# Patient Record
Sex: FEMALE | Race: WHITE | NOT HISPANIC OR LATINO
[De-identification: names, ages, dates, MRNs, and addresses within clinical notes are randomized per-mention and may not be internally consistent; named-entity substitution may affect disease eponyms.]

---

## 2018-07-03 PROBLEM — Z00.00 ENCOUNTER FOR PREVENTIVE HEALTH EXAMINATION: Status: ACTIVE | Noted: 2018-07-03

## 2018-08-02 ENCOUNTER — APPOINTMENT (OUTPATIENT)
Dept: BREAST CENTER | Facility: CLINIC | Age: 53
End: 2018-08-02
Payer: COMMERCIAL

## 2018-08-02 VITALS
SYSTOLIC BLOOD PRESSURE: 107 MMHG | HEART RATE: 101 BPM | HEIGHT: 65 IN | BODY MASS INDEX: 26.66 KG/M2 | DIASTOLIC BLOOD PRESSURE: 82 MMHG | WEIGHT: 160 LBS

## 2018-08-02 DIAGNOSIS — Z80.9 FAMILY HISTORY OF MALIGNANT NEOPLASM, UNSPECIFIED: ICD-10-CM

## 2018-08-02 DIAGNOSIS — Z80.1 FAMILY HISTORY OF MALIGNANT NEOPLASM OF TRACHEA, BRONCHUS AND LUNG: ICD-10-CM

## 2018-08-02 DIAGNOSIS — Z82.49 FAMILY HISTORY OF ISCHEMIC HEART DISEASE AND OTHER DISEASES OF THE CIRCULATORY SYSTEM: ICD-10-CM

## 2018-08-02 DIAGNOSIS — Z78.9 OTHER SPECIFIED HEALTH STATUS: ICD-10-CM

## 2018-08-02 DIAGNOSIS — I10 ESSENTIAL (PRIMARY) HYPERTENSION: ICD-10-CM

## 2018-08-02 PROCEDURE — 99214 OFFICE O/P EST MOD 30 MIN: CPT

## 2018-08-02 RX ORDER — LETROZOLE TABLETS 2.5 MG/1
2.5 TABLET, FILM COATED ORAL
Refills: 0 | Status: ACTIVE | COMMUNITY

## 2018-08-02 RX ORDER — ASPIRIN 81 MG
81 TABLET,CHEWABLE ORAL
Refills: 0 | Status: ACTIVE | COMMUNITY

## 2018-08-02 RX ORDER — IBUPROFEN 200 MG
600 CAPSULE ORAL
Refills: 0 | Status: ACTIVE | COMMUNITY

## 2018-08-02 RX ORDER — ADHESIVE TAPE 3"X 2.3 YD
50 MCG TAPE, NON-MEDICATED TOPICAL
Refills: 0 | Status: ACTIVE | COMMUNITY

## 2019-03-08 ENCOUNTER — APPOINTMENT (OUTPATIENT)
Dept: BREAST CENTER | Facility: CLINIC | Age: 54
End: 2019-03-08
Payer: COMMERCIAL

## 2019-03-08 VITALS
HEIGHT: 65 IN | HEART RATE: 75 BPM | WEIGHT: 152 LBS | SYSTOLIC BLOOD PRESSURE: 134 MMHG | BODY MASS INDEX: 25.33 KG/M2 | DIASTOLIC BLOOD PRESSURE: 88 MMHG

## 2019-03-08 PROCEDURE — 99214 OFFICE O/P EST MOD 30 MIN: CPT

## 2019-03-08 RX ORDER — ATORVASTATIN CALCIUM 10 MG/1
10 TABLET, FILM COATED ORAL
Refills: 0 | Status: ACTIVE | COMMUNITY

## 2019-03-08 NOTE — HISTORY OF PRESENT ILLNESS
[FreeTextEntry1] : S/P Bilat SSMX/R SLN/Implt (8/13/12)(DDP): R: +1.6cm IDCA/DCIS, SBR II, +LVI, -0/5 LN, +ant margin, ER+, [place]+, Her2 3+. L: No atypia/Ca\par R Stage IA IDCA\par S/P chemo/Herceptin (Mittelman)\par S/P R PMRT (Tinger)\par S/P L Implt explantation secondary to infection\par S/P Bilat LD Reconstx (10/13)(Jonathan)\par Started tmx/ASAb (5/13) > d/c'ed (4/18) > sw'ed to [facility] (4/18)\par +FH Br Ca (P. GM 90's)\par No MH/FH changes. Taking Ca/D. ROS reviewed/discussed. Last Bone Densitometry (11/16): "WNL"

## 2019-03-08 NOTE — PHYSICAL EXAM
[Normocephalic] : normocephalic [Atraumatic] : atraumatic [Supple] : supple [No Supraclavicular Adenopathy] : no supraclavicular adenopathy [No Cervical Adenopathy] : no cervical adenopathy [No Thyromegaly] : no thyromegaly [Normal Sinus Rhythm] : normal sinus rhythm [Examined in the supine and seated position] : examined in the supine and seated position [No dominant masses] : no dominant masses in right breast  [No dominant masses] : no dominant masses left breast [No Nipple Retraction] : no left nipple retraction [No Nipple Discharge] : no left nipple discharge [No Axillary Lymphadenopathy] : no left axillary lymphadenopathy [No Edema] : no edema [No Rashes] : no rashes [No Ulceration] : no ulceration [de-identified] : S/P SSMX/SLN/LD w/o rec. %. No lymphedema.  [de-identified] : S/P SSMX/LD w/o rec. %. No lymphedema.

## 2020-01-06 ENCOUNTER — APPOINTMENT (OUTPATIENT)
Dept: BREAST CENTER | Facility: CLINIC | Age: 55
End: 2020-01-06
Payer: COMMERCIAL

## 2020-01-06 VITALS
DIASTOLIC BLOOD PRESSURE: 76 MMHG | WEIGHT: 160 LBS | HEIGHT: 65 IN | SYSTOLIC BLOOD PRESSURE: 116 MMHG | BODY MASS INDEX: 26.66 KG/M2 | HEART RATE: 99 BPM

## 2020-01-06 PROCEDURE — 99214 OFFICE O/P EST MOD 30 MIN: CPT

## 2020-01-06 RX ORDER — HYDROCHLOROTHIAZIDE 12.5 MG/1
12.5 CAPSULE ORAL
Refills: 0 | Status: DISCONTINUED | COMMUNITY
End: 2020-01-06

## 2020-01-06 NOTE — HISTORY OF PRESENT ILLNESS
[FreeTextEntry1] : S/P Bilat SSMX/R SLN/Implt (8/13/12)(DDP): R: +1.6cm IDCA/DCIS, SBR II, +LVI, -0/5 LN, +ant margin, ER+, [place]+, Her2 3+. L: No atypia/Ca\par R Stage IA IDCA\par S/P chemo/Herceptin (Mittelman)\par S/P R PMRT (Tinger)\par S/P L Implt explantation secondary to infection\par S/P Bilat LD Reconstx (10/13)(Jonathan)\par Started tmx/ASAb (5/13) > d/c'ed (4/18) > sw'ed to [facility] (4/18)\par +FH Br Ca (P. GM 90's)\par +FH Pancr Ca (M. GF)\par BRCA (2012):"-"\par No MH/FH changes. Taking Ca/D. ROS reviewed/discussed. Last Bone Densitometry (11/16): "WNL"\par Thyroid Sono (6/14/19): Stable multinod gland

## 2020-01-06 NOTE — PHYSICAL EXAM
[Normocephalic] : normocephalic [Atraumatic] : atraumatic [Supple] : supple [No Cervical Adenopathy] : no cervical adenopathy [No Supraclavicular Adenopathy] : no supraclavicular adenopathy [Normal Sinus Rhythm] : normal sinus rhythm [No Thyromegaly] : no thyromegaly [No dominant masses] : no dominant masses in right breast  [Examined in the supine and seated position] : examined in the supine and seated position [No dominant masses] : no dominant masses left breast [No Nipple Retraction] : no left nipple retraction [No Nipple Discharge] : no left nipple discharge [No Axillary Lymphadenopathy] : no left axillary lymphadenopathy [No Edema] : no edema [No Rashes] : no rashes [No Ulceration] : no ulceration [de-identified] : S/P SSMX/SLN/LD w/o rec. %. No lymphedema.  [de-identified] : S/P SSMX/LD w/o susp fx's. %. No lymphedema.

## 2020-07-08 ENCOUNTER — APPOINTMENT (OUTPATIENT)
Dept: BREAST CENTER | Facility: CLINIC | Age: 55
End: 2020-07-08
Payer: COMMERCIAL

## 2020-07-08 VITALS
BODY MASS INDEX: 26.16 KG/M2 | HEART RATE: 103 BPM | HEIGHT: 65 IN | SYSTOLIC BLOOD PRESSURE: 109 MMHG | DIASTOLIC BLOOD PRESSURE: 76 MMHG | WEIGHT: 157 LBS

## 2020-07-08 PROCEDURE — 99214 OFFICE O/P EST MOD 30 MIN: CPT

## 2020-07-08 NOTE — PHYSICAL EXAM
[Normocephalic] : normocephalic [Atraumatic] : atraumatic [No Supraclavicular Adenopathy] : no supraclavicular adenopathy [Supple] : supple [No Cervical Adenopathy] : no cervical adenopathy [No Thyromegaly] : no thyromegaly [Examined in the supine and seated position] : examined in the supine and seated position [Normal Sinus Rhythm] : normal sinus rhythm [No dominant masses] : no dominant masses left breast [No dominant masses] : no dominant masses in right breast  [No Nipple Retraction] : no right nipple retraction [No Axillary Lymphadenopathy] : no left axillary lymphadenopathy [No Nipple Discharge] : no left nipple discharge [No Edema] : no edema [No Rashes] : no rashes [No Ulceration] : no ulceration [de-identified] : S/P SSMX/SLN/LD w/o rec. %. No lymphedema.  [de-identified] : S/P SSMX/LD w/o rec. %. No lymphedema.

## 2020-07-08 NOTE — HISTORY OF PRESENT ILLNESS
[FreeTextEntry1] : S/P Bilat SSMX/R SLN/Implt (8/13/12)(DDP): R: +1.6cm IDCA/DCIS, SBR II, +LVI, -0/5 LN, +ant margin, ER+, [place]+, Her2 3+. L: No atypia/Ca\par R Stage IA IDCA\par S/P chemo/Herceptin (Mittelman)\par S/P R PMRT (Tinger)\par S/P L Implt explantation secondary to infection\par S/P Bilat LD Reconstx (10/13)(Jonathan)\par Started tmx/ASAb (5/13) > d/c'ed (4/18) > sw'ed to [facility] (4/18)\par +FH Br Ca (P. GM 90's)\par +FH Pancr Ca (M. GF)\par BRCA (2012):"-"\par No MH/FH changes. Taking Ca/D. ROS reviewed/discussed. Last Bone Densitometry (11/16): "WNL"\par Thyroid Sono (6/14/19): Stable multinod gland

## 2021-01-14 ENCOUNTER — APPOINTMENT (OUTPATIENT)
Dept: BREAST CENTER | Facility: CLINIC | Age: 56
End: 2021-01-14
Payer: COMMERCIAL

## 2021-01-14 VITALS
WEIGHT: 144 LBS | HEART RATE: 111 BPM | HEIGHT: 65 IN | DIASTOLIC BLOOD PRESSURE: 79 MMHG | SYSTOLIC BLOOD PRESSURE: 112 MMHG | BODY MASS INDEX: 23.99 KG/M2

## 2021-01-14 PROCEDURE — 99214 OFFICE O/P EST MOD 30 MIN: CPT

## 2021-01-14 PROCEDURE — 99072 ADDL SUPL MATRL&STAF TM PHE: CPT

## 2021-01-14 RX ORDER — METOPROLOL SUCCINATE 25 MG/1
25 TABLET, EXTENDED RELEASE ORAL
Refills: 0 | Status: DISCONTINUED | COMMUNITY
End: 2021-01-14

## 2021-01-14 NOTE — PHYSICAL EXAM
[Normocephalic] : normocephalic [Atraumatic] : atraumatic [Supple] : supple [No Supraclavicular Adenopathy] : no supraclavicular adenopathy [No Cervical Adenopathy] : no cervical adenopathy [No Thyromegaly] : no thyromegaly [Normal Sinus Rhythm] : normal sinus rhythm [Examined in the supine and seated position] : examined in the supine and seated position [No dominant masses] : no dominant masses in right breast  [No dominant masses] : no dominant masses left breast [No Nipple Retraction] : no left nipple retraction [No Nipple Discharge] : no left nipple discharge [No Axillary Lymphadenopathy] : no left axillary lymphadenopathy [No Edema] : no edema [No Rashes] : no rashes [No Ulceration] : no ulceration [de-identified] : S/P SSMX/SLN/LD w/o rec. %. No lymphedema.\par  [de-identified] : S/P SSMX/LD w/o rec. %. No lymphedema.\par

## 2021-01-14 NOTE — HISTORY OF PRESENT ILLNESS
[FreeTextEntry1] : S/P Bilat SSMX/R SLN/Implt (8/13/12)(DDP): R: +1.6cm IDCA/DCIS, SBR II, +LVI, -0/5 LN, +ant margin, ER+, [place]+, Her2 3+. L: No atypia/Ca\par R Stage IA IDCA\par S/P chemo/Herceptin (Mittelman)\par S/P R PMRT (Tinger)\par S/P L Implt explantation secondary to infection\par S/P Bilat LD Reconstx (10/13)(Jonathan)\par Started tmx/ASAb (5/13) > d/c'ed (4/18) > sw'ed to [facility] (4/18)\par +FH Br Ca (P. GM 90's)\par +FH Pancr Ca (M. GF)\par BRCA (2012):"-"\par No MH/FH changes. Taking Ca/D. ROS reviewed/discussed. Last Bone Densitometry (11/16): "WNL"\par Thyroid Sono (6/14/19): Stable multinod gland
negative...

## 2021-02-14 ENCOUNTER — TRANSCRIPTION ENCOUNTER (OUTPATIENT)
Age: 56
End: 2021-02-14

## 2021-07-28 ENCOUNTER — APPOINTMENT (OUTPATIENT)
Dept: BREAST CENTER | Facility: CLINIC | Age: 56
End: 2021-07-28
Payer: COMMERCIAL

## 2021-07-28 ENCOUNTER — NON-APPOINTMENT (OUTPATIENT)
Age: 56
End: 2021-07-28

## 2021-07-28 VITALS
BODY MASS INDEX: 24.16 KG/M2 | OXYGEN SATURATION: 98 % | DIASTOLIC BLOOD PRESSURE: 83 MMHG | WEIGHT: 145 LBS | SYSTOLIC BLOOD PRESSURE: 129 MMHG | HEIGHT: 65 IN | HEART RATE: 88 BPM

## 2021-07-28 PROCEDURE — 99214 OFFICE O/P EST MOD 30 MIN: CPT

## 2021-07-28 RX ORDER — AZILSARTAN KAMEDOXOMIL 40 MG/1
40 TABLET ORAL
Refills: 0 | Status: DISCONTINUED | COMMUNITY
End: 2021-07-28

## 2021-07-28 NOTE — HISTORY OF PRESENT ILLNESS
[FreeTextEntry1] : S/P Bilat SSMX/R SLN/Implt (8/13/12)(DDP): R: +1.6cm IDCA/DCIS, SBR II, +LVI, -0/5 LN, +ant margin, ER+, [place]+, Her2 3+. L: No atypia/Ca\par R Stage IA IDCA\par S/P chemo/Herceptin (Mittelman)\par S/P R PMRT (Tinger)\par S/P L Implt explantation secondary to infection\par S/P Bilat LD Reconstx (10/13)(Jonathan)\par Started tmx/ASAb (5/13) > d/c'ed (4/18) > sw'ed to [facility] (4/18)\par +FH Br Ca (P. GM 90's)\par +FH Pancr Ca (M. GF)\par BRCA (2012):"-"\par No MH/FH changes. Taking Ca/D. ROS reviewed/discussed. Last Bone Densitometry (11/16): "WNL"\par Thyroid Sono (6/14/19): Stable multinod gland

## 2021-07-28 NOTE — PHYSICAL EXAM
[Normocephalic] : normocephalic [Atraumatic] : atraumatic [Supple] : supple [No Supraclavicular Adenopathy] : no supraclavicular adenopathy [No Cervical Adenopathy] : no cervical adenopathy [No Thyromegaly] : no thyromegaly [Normal Sinus Rhythm] : normal sinus rhythm [Examined in the supine and seated position] : examined in the supine and seated position [No dominant masses] : no dominant masses in right breast  [No dominant masses] : no dominant masses left breast [No Nipple Retraction] : no left nipple retraction [No Nipple Discharge] : no left nipple discharge [No Axillary Lymphadenopathy] : no left axillary lymphadenopathy [No Edema] : no edema [No Rashes] : no rashes [No Ulceration] : no ulceration [de-identified] : S/P SSMX/SLN/LD w/o rec. %. No lymphedema.  [de-identified] : S/P SSMX/LD w/o susp fx's. %. No lymphedema

## 2022-01-12 ENCOUNTER — APPOINTMENT (OUTPATIENT)
Dept: BREAST CENTER | Facility: CLINIC | Age: 57
End: 2022-01-12
Payer: COMMERCIAL

## 2022-01-12 VITALS
HEART RATE: 96 BPM | HEIGHT: 65 IN | SYSTOLIC BLOOD PRESSURE: 136 MMHG | DIASTOLIC BLOOD PRESSURE: 88 MMHG | WEIGHT: 152 LBS | BODY MASS INDEX: 25.33 KG/M2

## 2022-01-12 PROCEDURE — 99214 OFFICE O/P EST MOD 30 MIN: CPT

## 2022-01-12 NOTE — PHYSICAL EXAM
[Normocephalic] : normocephalic [Atraumatic] : atraumatic [Supple] : supple [No Supraclavicular Adenopathy] : no supraclavicular adenopathy [No Cervical Adenopathy] : no cervical adenopathy [No Thyromegaly] : no thyromegaly [Normal Sinus Rhythm] : normal sinus rhythm [Examined in the supine and seated position] : examined in the supine and seated position [No dominant masses] : no dominant masses in right breast  [No dominant masses] : no dominant masses left breast [No Nipple Retraction] : no left nipple retraction [No Nipple Discharge] : no left nipple discharge [No Axillary Lymphadenopathy] : no left axillary lymphadenopathy [No Edema] : no edema [No Rashes] : no rashes [No Ulceration] : no ulceration [de-identified] : S/P SSMX/SLN/LD w/o rec. %. No lymphedema.  [de-identified] : S/P SSMX/LD w/o susp fx's. %. No lymphedema

## 2022-01-12 NOTE — HISTORY OF PRESENT ILLNESS
[FreeTextEntry1] : S/P Bilat SSMX/R SLN/Implt (8/13/12)(DDP): R: +1.6cm IDCA/DCIS, SBR II, +LVI, -0/5 LN, +ant margin, ER+, [place]+, Her2 3+. L: No atypia/Ca\par R Stage IA IDCA\par S/P chemo/Herceptin (Mittelman)\par S/P R PMRT (Tinger)\par S/P L Implt explantation secondary to infection\par S/P Bilat LD Reconstx (10/13)(Jonathan)\par Started tmx/ASAb (5/13) > d/c'ed (4/18) > sw'ed to [facility] (4/18)\par +FH Br Ca (P. GM 90's)\par +FH Pancr Ca (M. GF)\par BRCA (Myriad BERRY) 7/24/12): -\par Got COVID (11/21) > monoclonal AB inf > min sx's\par Got second Pfizer (4/21)(LUE)\par Got Moderna booster (12/21)(LUE)\par No MH/FH changes. Taking Ca/D. ROS reviewed/discussed. Last Bone Densitometry (11/16): "WNL"\par Thyroid Sono (6/14/19): Stable multinod gland

## 2022-07-27 ENCOUNTER — APPOINTMENT (OUTPATIENT)
Dept: BREAST CENTER | Facility: CLINIC | Age: 57
End: 2022-07-27

## 2022-07-27 VITALS
WEIGHT: 160 LBS | HEIGHT: 65 IN | BODY MASS INDEX: 26.66 KG/M2 | HEART RATE: 97 BPM | SYSTOLIC BLOOD PRESSURE: 151 MMHG | DIASTOLIC BLOOD PRESSURE: 87 MMHG

## 2022-07-27 PROCEDURE — 99214 OFFICE O/P EST MOD 30 MIN: CPT

## 2022-07-27 RX ORDER — LOSARTAN POTASSIUM 25 MG/1
25 TABLET, FILM COATED ORAL
Qty: 90 | Refills: 0 | Status: ACTIVE | COMMUNITY
Start: 2021-07-29

## 2022-07-27 NOTE — HISTORY OF PRESENT ILLNESS
[FreeTextEntry1] : S/P Bilat SSMX/R SLN/Implt (8/13/12)(DDP): R: +1.6cm IDCA/DCIS, SBR II, +LVI, -0/5 LN, +ant margin, ER+, [place]+, Her2 3+. L: No atypia/Ca\par R Stage IA IDCA\par S/P chemo/Herceptin (Mittelman)\par S/P R PMRT (Tinger)\par S/P L Implt explantation secondary to infection\par S/P Bilat LD Reconstx (10/13)(Jonathan)\par Started tmx/ASAb (5/13) > d/c'ed (4/18) > sw'ed to [facility] (4/18)\par +FH Br Ca (P. GM 90's)\par +FH Pancr Ca (M. GF)\par BRCA (Myriad BERRY) 7/24/12): -\par Got COVID (11/21) > monoclonal AB inf > min sx's\par Got second Pfizer (4/21)(LUE)\par Got Moderna booster (12/21)(LUE)\par No MH/FH changes. Taking Ca/D. ROS reviewed/discussed. Last Bone Densitometry (11/16): "WNL"\par Thyroid Sono (6/14/19): Stable multinod gland

## 2022-07-27 NOTE — PHYSICAL EXAM
[Normocephalic] : normocephalic [Atraumatic] : atraumatic [Supple] : supple [No Supraclavicular Adenopathy] : no supraclavicular adenopathy [No Cervical Adenopathy] : no cervical adenopathy [No Thyromegaly] : no thyromegaly [Normal Sinus Rhythm] : normal sinus rhythm [Examined in the supine and seated position] : examined in the supine and seated position [No dominant masses] : no dominant masses in right breast  [No dominant masses] : no dominant masses left breast [No Nipple Retraction] : no left nipple retraction [No Nipple Discharge] : no left nipple discharge [No Axillary Lymphadenopathy] : no left axillary lymphadenopathy [No Edema] : no edema [No Rashes] : no rashes [No Ulceration] : no ulceration [de-identified] : S/P SSMX/SLN/PMRT/LD w/o rec. %. No lymphedema.  [de-identified] : S/P SSMX/LD w/o susp fx's. %. No lymphedema

## 2023-01-25 ENCOUNTER — APPOINTMENT (OUTPATIENT)
Dept: BREAST CENTER | Facility: CLINIC | Age: 58
End: 2023-01-25
Payer: COMMERCIAL

## 2023-01-25 VITALS
WEIGHT: 163 LBS | SYSTOLIC BLOOD PRESSURE: 138 MMHG | BODY MASS INDEX: 27.16 KG/M2 | HEART RATE: 83 BPM | DIASTOLIC BLOOD PRESSURE: 80 MMHG | HEIGHT: 65 IN

## 2023-01-25 DIAGNOSIS — E04.1 NONTOXIC SINGLE THYROID NODULE: ICD-10-CM

## 2023-01-25 DIAGNOSIS — K80.20 CALCULUS OF GALLBLADDER W/OUT CHOLECYSTITIS W/OUT OBSTRUCTION: ICD-10-CM

## 2023-01-25 PROCEDURE — 99214 OFFICE O/P EST MOD 30 MIN: CPT

## 2023-01-25 RX ORDER — NIRMATRELVIR AND RITONAVIR 300-100 MG
20 X 150 MG & KIT ORAL
Qty: 30 | Refills: 0 | Status: DISCONTINUED | COMMUNITY
Start: 2022-05-26 | End: 2023-01-25

## 2023-01-25 NOTE — PHYSICAL EXAM
[Normocephalic] : normocephalic [Atraumatic] : atraumatic [Supple] : supple [No Supraclavicular Adenopathy] : no supraclavicular adenopathy [No Cervical Adenopathy] : no cervical adenopathy [No Thyromegaly] : no thyromegaly [Normal Sinus Rhythm] : normal sinus rhythm [Examined in the supine and seated position] : examined in the supine and seated position [No dominant masses] : no dominant masses in right breast  [No dominant masses] : no dominant masses left breast [No Nipple Retraction] : no left nipple retraction [No Nipple Discharge] : no left nipple discharge [No Axillary Lymphadenopathy] : no left axillary lymphadenopathy [No Edema] : no edema [No Rashes] : no rashes [No Ulceration] : no ulceration [de-identified] : S/P SSMX/SLN/PMRT/LD w/o rec. %. No lymphedema.  [de-identified] : S/P SSMX/LD w/o susp fx's. %. No lymphedema

## 2023-07-29 ENCOUNTER — NON-APPOINTMENT (OUTPATIENT)
Age: 58
End: 2023-07-29

## 2023-08-02 ENCOUNTER — APPOINTMENT (OUTPATIENT)
Dept: BREAST CENTER | Facility: CLINIC | Age: 58
End: 2023-08-02
Payer: COMMERCIAL

## 2023-08-02 VITALS
DIASTOLIC BLOOD PRESSURE: 92 MMHG | WEIGHT: 169 LBS | BODY MASS INDEX: 28.16 KG/M2 | SYSTOLIC BLOOD PRESSURE: 137 MMHG | HEIGHT: 65 IN | HEART RATE: 106 BPM

## 2023-08-02 PROCEDURE — 99214 OFFICE O/P EST MOD 30 MIN: CPT

## 2023-08-02 NOTE — HISTORY OF PRESENT ILLNESS
[FreeTextEntry1] : S/P Bilat SSMX/R SLN/Implt (8/13/12)(DDP): R: +1.6cm IDCA/DCIS, SBR II, +LVI, -0/5 LN, +ant margin, ER+, [place]+, Her2 3+. L: No atypia/Ca R Stage IA IDCA S/P chemo/Herceptin (Mittelman) S/P R PMRT (Tinger) S/P L Implt explantation secondary to infection S/P Bilat LD Reconstx (10/13)(Jonathan) Started tmx/ASAb (5/13) > d/c'ed (4/18) > sw'ed to [facility] (4/18) +FH Br Ca (P. GM 90's) +FH Pancr Ca (M. GF) BRCA (Myriad BERRY) 7/24/12): - S/P cataracts OD (6/23) > went well Colonoscopy(2/23): "WNL" > 10yrs  PAP/Pelvic (3/23): "WNL"  Got COVID (11/21) > monoclonal AB inf > min sx's Got second Pfizer (4/21)(LUE) Got Moderna booster (12/21)(LUE) No MH/FH changes. Taking Ca/D. ROS reviewed/discussed. Last Bone Densitometry (11/16): "WNL" Thyroid Sono (6/14/19): Stable multinod gland

## 2023-08-02 NOTE — PHYSICAL EXAM
[Normocephalic] : normocephalic [Atraumatic] : atraumatic [Supple] : supple [No Supraclavicular Adenopathy] : no supraclavicular adenopathy [No Cervical Adenopathy] : no cervical adenopathy [No Thyromegaly] : no thyromegaly [Normal Sinus Rhythm] : normal sinus rhythm [Examined in the supine and seated position] : examined in the supine and seated position [No dominant masses] : no dominant masses in right breast  [No dominant masses] : no dominant masses left breast [No Nipple Retraction] : no left nipple retraction [No Nipple Discharge] : no left nipple discharge [No Axillary Lymphadenopathy] : no left axillary lymphadenopathy [No Edema] : no edema [No Rashes] : no rashes [No Ulceration] : no ulceration [de-identified] : S/P SSMX/SLN/PMRT/LD w/o rec. %. No lymphedema.  [de-identified] : S/P SSMX/LD w/o susp fx's. %. No lymphedema

## 2024-02-07 ENCOUNTER — APPOINTMENT (OUTPATIENT)
Dept: BREAST CENTER | Facility: CLINIC | Age: 59
End: 2024-02-07
Payer: COMMERCIAL

## 2024-02-07 VITALS
SYSTOLIC BLOOD PRESSURE: 138 MMHG | HEIGHT: 65 IN | BODY MASS INDEX: 28.66 KG/M2 | WEIGHT: 172 LBS | HEART RATE: 90 BPM | DIASTOLIC BLOOD PRESSURE: 92 MMHG

## 2024-02-07 DIAGNOSIS — Z92.3 PERSONAL HISTORY OF IRRADIATION: ICD-10-CM

## 2024-02-07 DIAGNOSIS — Z92.21 PERSONAL HISTORY OF ANTINEOPLASTIC CHEMOTHERAPY: ICD-10-CM

## 2024-02-07 DIAGNOSIS — Z80.0 FAMILY HISTORY OF MALIGNANT NEOPLASM OF DIGESTIVE ORGANS: ICD-10-CM

## 2024-02-07 DIAGNOSIS — C50.911 MALIGNANT NEOPLASM OF UNSPECIFIED SITE OF RIGHT FEMALE BREAST: ICD-10-CM

## 2024-02-07 DIAGNOSIS — Z86.16 PERSONAL HISTORY OF COVID-19: ICD-10-CM

## 2024-02-07 DIAGNOSIS — Z80.3 FAMILY HISTORY OF MALIGNANT NEOPLASM OF BREAST: ICD-10-CM

## 2024-02-07 PROCEDURE — 99213 OFFICE O/P EST LOW 20 MIN: CPT

## 2024-02-07 NOTE — HISTORY OF PRESENT ILLNESS
[FreeTextEntry1] : S/P Bilat SSMX/R SLN/Implt (8/13/12)(DDP): R: +1.6cm IDCA/DCIS, SBR II, +LVI, -0/5 LN, +ant margin, ER+, [place]+, Her2 3+. L: No atypia/Ca R Stage IA IDCA S/P chemo/Herceptin (Mittelman) S/P R PMRT (Tinger) S/P L Implt explantation secondary to infection S/P Bilat LD Reconstx (10/13)(Rufusabrobson) Started tmx/ASAb (5/13) > d/c'ed (4/18) > sw'ed to [facility] (4/18) > [facility]/ASAb d/c'ed (10/23) +FH Br Ca (P. GM 90's) +FH Pancr Ca (M. GF) BRCA (Myriad BERRY) 7/24/12): - S/P cataracts OD (6/23) > went well Colonoscopy(2/23): "WNL" > 10yrs  PAP/Pelvic (3/23): "WNL"  Got COVID (11/21) > monoclonal AB inf > min sx's Got second Pfizer (4/21)(LUE) Got Moderna booster (12/21)(LUE) No MH/FH changes. Taking Ca/D. ROS reviewed/discussed. Last Bone Densitometry (8/23): +osteopenia w/ worsening Bilat Sono (Mittelman)(8/11/23): NSF Thyroid Sono (6/14/19): Stable multinod gland

## 2024-02-07 NOTE — PHYSICAL EXAM
[Normocephalic] : normocephalic [Atraumatic] : atraumatic [Supple] : supple [No Supraclavicular Adenopathy] : no supraclavicular adenopathy [No Cervical Adenopathy] : no cervical adenopathy [No Thyromegaly] : no thyromegaly [Normal Sinus Rhythm] : normal sinus rhythm [Examined in the supine and seated position] : examined in the supine and seated position [No dominant masses] : no dominant masses in right breast  [No dominant masses] : no dominant masses left breast [No Nipple Retraction] : no left nipple retraction [No Nipple Discharge] : no left nipple discharge [No Axillary Lymphadenopathy] : no left axillary lymphadenopathy [No Edema] : no edema [No Rashes] : no rashes [No Ulceration] : no ulceration [de-identified] : S/P SSMX/SLN/PMRT/LD w/o rec. %. No lymphedema.  [de-identified] : S/P SSMX/LD w/o susp fx's. %. No lymphedema

## 2024-08-14 ENCOUNTER — APPOINTMENT (OUTPATIENT)
Dept: BREAST CENTER | Facility: CLINIC | Age: 59
End: 2024-08-14
Payer: COMMERCIAL

## 2024-08-14 VITALS
HEART RATE: 106 BPM | WEIGHT: 172 LBS | BODY MASS INDEX: 28.66 KG/M2 | DIASTOLIC BLOOD PRESSURE: 81 MMHG | HEIGHT: 65 IN | SYSTOLIC BLOOD PRESSURE: 139 MMHG

## 2024-08-14 DIAGNOSIS — Z86.16 PERSONAL HISTORY OF COVID-19: ICD-10-CM

## 2024-08-14 DIAGNOSIS — Z92.3 PERSONAL HISTORY OF IRRADIATION: ICD-10-CM

## 2024-08-14 DIAGNOSIS — C50.911 MALIGNANT NEOPLASM OF UNSPECIFIED SITE OF RIGHT FEMALE BREAST: ICD-10-CM

## 2024-08-14 DIAGNOSIS — Z92.21 PERSONAL HISTORY OF ANTINEOPLASTIC CHEMOTHERAPY: ICD-10-CM

## 2024-08-14 PROCEDURE — 99213 OFFICE O/P EST LOW 20 MIN: CPT

## 2024-08-14 NOTE — HISTORY OF PRESENT ILLNESS
[FreeTextEntry1] : S/P Bilat SSMX/R SLN/Implt (8/13/12)(DDP): R: +1.6cm IDCA/DCIS, SBR II, +LVI, -0/5 LN, +ant margin, ER+, [place]+, Her2 3+. L: No atypia/Ca R [id number] Stage IA IDCA S/P chemo/Herceptin (Mittelman) S/P R PMRT (Tinger) S/P L Implt explantation secondary to infection S/P Bilat LD Reconstx (10/13)(Jonathan) Started tmx/ASAb (5/13) > d/c'ed (4/18) > sw'ed to [facility] (4/18) > [facility]/ASAb d/c'ed (10/23) +FH Br Ca (P. GM 90's) +FH Pancr Ca (M. GF) BRCA (Myriad BERRY) 7/24/12): - S/P cataracts OD (6/23) > went well Colonoscopy (2/23): "WNL" > 10yrs  PAP/Pelvic (3/23): "WNL"  Got COVID (11/21) > monoclonal AB inf > min sx's Got second Pfizer (4/21)(LUE) Got Moderna booster (12/21)(LUE) No MH/FH changes. Taking Ca/D. ROS reviewed/discussed. Last Bone Densitometry (8/23): +osteopenia w/ worsening Bilat Sono (Mittelman)(8/11/23): NSF Thyroid Sono (6/14/19): Stable multinod gland

## 2025-02-11 ENCOUNTER — NON-APPOINTMENT (OUTPATIENT)
Age: 60
End: 2025-02-11

## 2025-02-13 ENCOUNTER — APPOINTMENT (OUTPATIENT)
Dept: BREAST CENTER | Facility: CLINIC | Age: 60
End: 2025-02-13
Payer: COMMERCIAL

## 2025-02-13 VITALS — HEIGHT: 65 IN | WEIGHT: 155 LBS | BODY MASS INDEX: 25.83 KG/M2

## 2025-02-13 DIAGNOSIS — Z92.21 PERSONAL HISTORY OF ANTINEOPLASTIC CHEMOTHERAPY: ICD-10-CM

## 2025-02-13 DIAGNOSIS — Z92.3 PERSONAL HISTORY OF IRRADIATION: ICD-10-CM

## 2025-02-13 DIAGNOSIS — C50.911 MALIGNANT NEOPLASM OF UNSPECIFIED SITE OF RIGHT FEMALE BREAST: ICD-10-CM

## 2025-02-13 DIAGNOSIS — Z80.0 FAMILY HISTORY OF MALIGNANT NEOPLASM OF DIGESTIVE ORGANS: ICD-10-CM

## 2025-02-13 DIAGNOSIS — Z80.3 FAMILY HISTORY OF MALIGNANT NEOPLASM OF BREAST: ICD-10-CM

## 2025-02-13 DIAGNOSIS — Z86.16 PERSONAL HISTORY OF COVID-19: ICD-10-CM

## 2025-02-13 PROCEDURE — 99213 OFFICE O/P EST LOW 20 MIN: CPT
